# Patient Record
Sex: FEMALE | Race: OTHER | NOT HISPANIC OR LATINO | URBAN - METROPOLITAN AREA
[De-identification: names, ages, dates, MRNs, and addresses within clinical notes are randomized per-mention and may not be internally consistent; named-entity substitution may affect disease eponyms.]

---

## 2017-08-06 ENCOUNTER — EMERGENCY (EMERGENCY)
Facility: HOSPITAL | Age: 82
LOS: 1 days | Discharge: HOME | End: 2017-08-06
Attending: EMERGENCY MEDICINE | Admitting: EMERGENCY MEDICINE
Payer: MEDICARE

## 2017-08-06 VITALS
OXYGEN SATURATION: 100 % | SYSTOLIC BLOOD PRESSURE: 172 MMHG | DIASTOLIC BLOOD PRESSURE: 75 MMHG | RESPIRATION RATE: 18 BRPM | HEART RATE: 71 BPM | TEMPERATURE: 98 F

## 2017-08-06 VITALS
DIASTOLIC BLOOD PRESSURE: 74 MMHG | SYSTOLIC BLOOD PRESSURE: 155 MMHG | HEART RATE: 72 BPM | TEMPERATURE: 98 F | OXYGEN SATURATION: 99 % | RESPIRATION RATE: 20 BRPM

## 2017-08-06 LAB
ALBUMIN SERPL ELPH-MCNC: 4.1 G/DL — SIGNIFICANT CHANGE UP (ref 3.3–5)
ALP SERPL-CCNC: 58 U/L — SIGNIFICANT CHANGE UP (ref 40–120)
ALT FLD-CCNC: 18 U/L RC — SIGNIFICANT CHANGE UP (ref 10–45)
ANION GAP SERPL CALC-SCNC: 13 MMOL/L — SIGNIFICANT CHANGE UP (ref 5–17)
APTT BLD: 24.3 SEC — LOW (ref 27.5–37.4)
AST SERPL-CCNC: 29 U/L — SIGNIFICANT CHANGE UP (ref 10–40)
BASOPHILS # BLD AUTO: 0.1 K/UL — SIGNIFICANT CHANGE UP (ref 0–0.2)
BASOPHILS NFR BLD AUTO: 1.1 % — SIGNIFICANT CHANGE UP (ref 0–2)
BILIRUB SERPL-MCNC: 0.3 MG/DL — SIGNIFICANT CHANGE UP (ref 0.2–1.2)
BUN SERPL-MCNC: 28 MG/DL — HIGH (ref 7–23)
CALCIUM SERPL-MCNC: 9.2 MG/DL — SIGNIFICANT CHANGE UP (ref 8.4–10.5)
CHLORIDE SERPL-SCNC: 101 MMOL/L — SIGNIFICANT CHANGE UP (ref 96–108)
CO2 SERPL-SCNC: 27 MMOL/L — SIGNIFICANT CHANGE UP (ref 22–31)
CREAT SERPL-MCNC: 1.01 MG/DL — SIGNIFICANT CHANGE UP (ref 0.5–1.3)
EOSINOPHIL # BLD AUTO: 0.2 K/UL — SIGNIFICANT CHANGE UP (ref 0–0.5)
EOSINOPHIL NFR BLD AUTO: 2.8 % — SIGNIFICANT CHANGE UP (ref 0–6)
GAS PNL BLDV: SIGNIFICANT CHANGE UP
GLUCOSE SERPL-MCNC: 111 MG/DL — HIGH (ref 70–99)
HCT VFR BLD CALC: 32.5 % — LOW (ref 34.5–45)
HGB BLD-MCNC: 11.3 G/DL — LOW (ref 11.5–15.5)
INR BLD: 0.96 RATIO — SIGNIFICANT CHANGE UP (ref 0.88–1.16)
LYMPHOCYTES # BLD AUTO: 1.7 K/UL — SIGNIFICANT CHANGE UP (ref 1–3.3)
LYMPHOCYTES # BLD AUTO: 22 % — SIGNIFICANT CHANGE UP (ref 13–44)
MCHC RBC-ENTMCNC: 31.9 PG — SIGNIFICANT CHANGE UP (ref 27–34)
MCHC RBC-ENTMCNC: 34.9 GM/DL — SIGNIFICANT CHANGE UP (ref 32–36)
MCV RBC AUTO: 91.4 FL — SIGNIFICANT CHANGE UP (ref 80–100)
MONOCYTES # BLD AUTO: 0.5 K/UL — SIGNIFICANT CHANGE UP (ref 0–0.9)
MONOCYTES NFR BLD AUTO: 6.9 % — SIGNIFICANT CHANGE UP (ref 2–14)
NEUTROPHILS # BLD AUTO: 5.1 K/UL — SIGNIFICANT CHANGE UP (ref 1.8–7.4)
NEUTROPHILS NFR BLD AUTO: 67.1 % — SIGNIFICANT CHANGE UP (ref 43–77)
NT-PROBNP SERPL-SCNC: 264 PG/ML — SIGNIFICANT CHANGE UP (ref 0–300)
PLATELET # BLD AUTO: 216 K/UL — SIGNIFICANT CHANGE UP (ref 150–400)
POTASSIUM SERPL-MCNC: 4 MMOL/L — SIGNIFICANT CHANGE UP (ref 3.5–5.3)
POTASSIUM SERPL-SCNC: 4 MMOL/L — SIGNIFICANT CHANGE UP (ref 3.5–5.3)
PROT SERPL-MCNC: 6.8 G/DL — SIGNIFICANT CHANGE UP (ref 6–8.3)
PROTHROM AB SERPL-ACNC: 10.4 SEC — SIGNIFICANT CHANGE UP (ref 9.8–12.7)
RBC # BLD: 3.55 M/UL — LOW (ref 3.8–5.2)
RBC # FLD: 14.8 % — HIGH (ref 10.3–14.5)
SODIUM SERPL-SCNC: 141 MMOL/L — SIGNIFICANT CHANGE UP (ref 135–145)
TROPONIN T SERPL-MCNC: <0.01 NG/ML — SIGNIFICANT CHANGE UP (ref 0–0.06)
TROPONIN T SERPL-MCNC: <0.01 NG/ML — SIGNIFICANT CHANGE UP (ref 0–0.06)
WBC # BLD: 7.6 K/UL — SIGNIFICANT CHANGE UP (ref 3.8–10.5)
WBC # FLD AUTO: 7.6 K/UL — SIGNIFICANT CHANGE UP (ref 3.8–10.5)

## 2017-08-06 PROCEDURE — 71045 X-RAY EXAM CHEST 1 VIEW: CPT

## 2017-08-06 PROCEDURE — 84484 ASSAY OF TROPONIN QUANT: CPT

## 2017-08-06 PROCEDURE — 85730 THROMBOPLASTIN TIME PARTIAL: CPT

## 2017-08-06 PROCEDURE — 71275 CT ANGIOGRAPHY CHEST: CPT | Mod: 26

## 2017-08-06 PROCEDURE — 85610 PROTHROMBIN TIME: CPT

## 2017-08-06 PROCEDURE — 85014 HEMATOCRIT: CPT

## 2017-08-06 PROCEDURE — 80053 COMPREHEN METABOLIC PANEL: CPT

## 2017-08-06 PROCEDURE — 99284 EMERGENCY DEPT VISIT MOD MDM: CPT | Mod: 25

## 2017-08-06 PROCEDURE — 99284 EMERGENCY DEPT VISIT MOD MDM: CPT | Mod: 25,GC

## 2017-08-06 PROCEDURE — 93010 ELECTROCARDIOGRAM REPORT: CPT

## 2017-08-06 PROCEDURE — 71010: CPT | Mod: 26

## 2017-08-06 PROCEDURE — 82947 ASSAY GLUCOSE BLOOD QUANT: CPT

## 2017-08-06 PROCEDURE — 85027 COMPLETE CBC AUTOMATED: CPT

## 2017-08-06 PROCEDURE — 84132 ASSAY OF SERUM POTASSIUM: CPT

## 2017-08-06 PROCEDURE — 82330 ASSAY OF CALCIUM: CPT

## 2017-08-06 PROCEDURE — 83880 ASSAY OF NATRIURETIC PEPTIDE: CPT

## 2017-08-06 PROCEDURE — 82803 BLOOD GASES ANY COMBINATION: CPT

## 2017-08-06 PROCEDURE — 84295 ASSAY OF SERUM SODIUM: CPT

## 2017-08-06 PROCEDURE — 83605 ASSAY OF LACTIC ACID: CPT

## 2017-08-06 PROCEDURE — 82435 ASSAY OF BLOOD CHLORIDE: CPT

## 2017-08-06 PROCEDURE — 93005 ELECTROCARDIOGRAM TRACING: CPT

## 2017-08-06 PROCEDURE — 71275 CT ANGIOGRAPHY CHEST: CPT

## 2017-08-06 RX ORDER — SODIUM CHLORIDE 9 MG/ML
3 INJECTION INTRAMUSCULAR; INTRAVENOUS; SUBCUTANEOUS ONCE
Qty: 0 | Refills: 0 | Status: COMPLETED | OUTPATIENT
Start: 2017-08-06 | End: 2017-08-06

## 2017-08-06 RX ADMIN — SODIUM CHLORIDE 3 MILLILITER(S): 9 INJECTION INTRAMUSCULAR; INTRAVENOUS; SUBCUTANEOUS at 04:46

## 2017-08-06 NOTE — ED PROVIDER NOTE - PMH
<<----- Click to add NO pertinent Past Medical History Aortic valve stenosis, unspecified etiology    Other specified hearing loss of both ears

## 2017-08-06 NOTE — ED PROVIDER NOTE - ENMT NEGATIVE STATEMENT, MLM
Ears: no ear pain and + hearing problems.Nose: no nasal congestion.Mouth/Throat: no sore throat.Neck: no pain.

## 2017-08-06 NOTE — ED PROVIDER NOTE - MEDICAL DECISION MAKING DETAILS
89yoF with hx AS now w/ progressive SOB; exam findings of lower wheezing and edematous lower extremities. Work up CBC, CMP, EKG, CXR, CTA. Labs result with findings of anemia. 89yoF with hx AS now w/ progressive SOB; exam findings of lower wheezing and edematous lower extremities. Work up CBC, CMP, EKG, CXR, CTA. Labs result with findings of anemia. CXR and CTA show no obvious effusion, infiltrate, or PE. Repeat troponin is ordered to definitively r/o acs. At this time the patient's ongoing shortness of breath is likely d/t her AS and anemia. At this time she is stable and I believe she would be able to follow up with her cardiologist in the outpatient setting. This is discussed with the patient who agrees ***.

## 2017-08-06 NOTE — ED ADULT NURSE REASSESSMENT NOTE - COMFORT CARE
side rails up/treatment delay explained/wait time explained/ambulated to bathroom/plan of care explained/assisted to bathroom/darkened lights/warm blanket provided

## 2017-08-06 NOTE — ED ADULT NURSE NOTE - OBJECTIVE STATEMENT
89y female BIBA c/o SOB. Hx of HTN and aortic stenosis. Patient states 1 episode of SOB lasting 1 hour. Denies chest pain, sob, fever/chills, n/v/d. 89y female BIBA c/o SOB. A&Ox3, neuro intact. Hx of HTN, aortic stenosis and breast cancer with B/L mastectomy 40years ago. Patient states able to do BP and IV on right arm, HCP always use right arm. Patient states 1 episode of SOB this morning lasting 1 hour. Symptoms resolved prior to coming to ED. Denies dyspnea.  Denies chest pain, sob, fever/chills, n/v/d. Patient ambulates with steady gait. Crackles noted in LLL.

## 2017-08-06 NOTE — ED PROVIDER NOTE - OBJECTIVE STATEMENT
The patient reports that for the past month she has had worsening of her shortness of breath. She has a history of aortic stenosis and was told recently by her cardiologist that it may be getting worse, and due to this she became concerned it was her heart and decided to come in to the emergency department. Here in the department she notes that the SOB is worst when she is moving, but is still present while laying in her bed in the ED. She describes it as a feelings of not getting a full breath, it seems worse when she slouches, and seems to improve if she sits upright. She denies having had fevers, chills, chest pain, nausea, vomiting, abdominal pain, or other symptoms of concern.

## 2017-08-06 NOTE — ED PROVIDER NOTE - PHYSICAL EXAMINATION
Physical Exam:   Gen: NAD, non-toxic, conversational, hard of hearing  Eyes: EOMI   HEENT: Normocephalic, atraumatic. External ears normal, no rhinorrhea, moist mucous membranes.   CV: RRR, systolic murmur LUSB.   Resp: Wheezes at the bases, otherwise clear. Non-labored, speaking without difficulty on room air  Abd: soft, non tender, non rigid, no guarding or rebound tenderness  Skin: dry, wwp   Neuro: AOx3, speech is fluent and appropriate

## 2017-08-06 NOTE — ED PROVIDER NOTE - PROGRESS NOTE DETAILS
patient denies shortness of breath at this time. states has mouth dryness. otherwise feeling well. Signout endorsed to me. Patient to be discharged after 2nd negative troponin, patient does not want to stay and wants to go home, does not want cardiac intervention for her likely valvular cardiac disease, but planning to follow-up with her cardiologist tomorrow. Patient with decision making capacity and understands the risks and benefits of being discharged home, offered admission but patient declined.  - Stewart Alvarez MD Attending Javier: pt asking to be discharged. repeat troponin negative. will follow up with her cardiologist tomorrow. did not want any further cardiac intervention

## 2017-08-06 NOTE — ED PROVIDER NOTE - ATTENDING CONTRIBUTION TO CARE
89 year old female hx of as, presenting with sob lasted 1 hr overnight now improving. no cp or n/v/d. never before. no cough. was offered valve replacement but declined. LLL w rales, lungs clear. as murmur to neck bl pitting edema. will check labs, ekg, xr chest, cta chest.

## 2018-09-20 NOTE — ED PROVIDER NOTE - QRS

## 2020-03-18 NOTE — ED PROVIDER NOTE - DATE/TIME 1
Please don't forget:     -call mental health intake counselor: Abbey Rockwell  [phone]    -perform 24 hour urine collection for protein (order already in system).  Need to go to the lab at hospital to receive jugs & instructions    -fetal echocar 06-Aug-2017 07:10

## 2020-07-15 NOTE — ED ADULT NURSE NOTE - TEMPERATURE IN CELSIUS (DEGREES C)
Recommended patient be sure to get at least 70 grams of protein per day by eating small, frequent meals all with high lean protein choices. Be sure to limit/cut back on daily carbohydrate intake. Discussed with the patient the recommended amount of water per day to intake. Reviewed vitamin requirements. Be sure to do routine exercise including both cardio and strength training.     
36.6